# Patient Record
Sex: MALE | Race: WHITE | NOT HISPANIC OR LATINO | URBAN - METROPOLITAN AREA
[De-identification: names, ages, dates, MRNs, and addresses within clinical notes are randomized per-mention and may not be internally consistent; named-entity substitution may affect disease eponyms.]

---

## 2018-01-19 ENCOUNTER — TRANSFERRED RECORDS (OUTPATIENT)
Dept: HEALTH INFORMATION MANAGEMENT | Facility: CLINIC | Age: 58
End: 2018-01-19

## 2018-04-04 ENCOUNTER — TRANSFERRED RECORDS (OUTPATIENT)
Dept: HEALTH INFORMATION MANAGEMENT | Facility: CLINIC | Age: 58
End: 2018-04-04

## 2018-05-16 ENCOUNTER — TRANSFERRED RECORDS (OUTPATIENT)
Dept: HEALTH INFORMATION MANAGEMENT | Facility: CLINIC | Age: 58
End: 2018-05-16

## 2019-05-30 ENCOUNTER — TRANSFERRED RECORDS (OUTPATIENT)
Dept: HEALTH INFORMATION MANAGEMENT | Facility: CLINIC | Age: 59
End: 2019-05-30

## 2019-06-10 ENCOUNTER — AMBULATORY - HEALTHEAST (OUTPATIENT)
Dept: PHYSICAL MEDICINE AND REHAB | Facility: CLINIC | Age: 59
End: 2019-06-10

## 2019-06-10 DIAGNOSIS — M54.12 CERVICAL RADICULOPATHY: ICD-10-CM

## 2019-06-10 DIAGNOSIS — G89.29 CHRONIC NECK PAIN: ICD-10-CM

## 2019-06-10 DIAGNOSIS — M54.2 CHRONIC NECK PAIN: ICD-10-CM

## 2019-06-11 ENCOUNTER — RECORDS - HEALTHEAST (OUTPATIENT)
Dept: ADMINISTRATIVE | Facility: OTHER | Age: 59
End: 2019-06-11

## 2019-06-17 ENCOUNTER — HOSPITAL ENCOUNTER (OUTPATIENT)
Dept: ULTRASOUND IMAGING | Facility: CLINIC | Age: 59
Discharge: HOME | End: 2019-06-17
Admitting: RADIOLOGY

## 2019-06-17 DIAGNOSIS — R93.89 ABNORMAL IMAGING OF THYROID: ICD-10-CM

## 2019-06-18 LAB
CAP COMMENT: NORMAL
LAB AP CHARGES (HE HISTORICAL CONVERSION): NORMAL
LAB AP INITIAL CYTO EVAL (HE HISTORICAL CONVERSION): NORMAL
LAB MED GENERAL PATH INTERP (HE HISTORICAL CONVERSION): NORMAL
PATH REPORT.COMMENTS IMP SPEC: NORMAL
PATH REPORT.FINAL DX SPEC: NORMAL
PATH REPORT.MICROSCOPIC SPEC OTHER STN: NORMAL
PATH REPORT.RELEVANT HX SPEC: NORMAL
SPECIMEN DESCRIPTION: NORMAL

## 2019-06-21 ENCOUNTER — HOSPITAL ENCOUNTER (OUTPATIENT)
Dept: PHYSICAL MEDICINE AND REHAB | Facility: CLINIC | Age: 59
Discharge: HOME | End: 2019-06-21
Attending: PHYSICAL MEDICINE & REHABILITATION

## 2019-06-21 DIAGNOSIS — M48.02 FORAMINAL STENOSIS OF CERVICAL REGION: ICD-10-CM

## 2019-06-21 DIAGNOSIS — M54.2 NECK PAIN: ICD-10-CM

## 2019-06-21 DIAGNOSIS — M54.12 CERVICAL RADICULITIS: ICD-10-CM

## 2019-06-21 RX ORDER — TRAMADOL HYDROCHLORIDE 50 MG/1
50 TABLET ORAL 2 TIMES DAILY
Status: SHIPPED | COMMUNITY
Start: 2019-06-06

## 2019-06-21 RX ORDER — PANTOPRAZOLE SODIUM 40 MG/1
40 TABLET, DELAYED RELEASE ORAL
Status: SHIPPED | COMMUNITY
Start: 2017-12-22

## 2019-06-21 RX ORDER — GABAPENTIN 300 MG/1
CAPSULE ORAL
Qty: 270 CAPSULE | Refills: 2 | Status: SHIPPED | OUTPATIENT
Start: 2019-06-21

## 2019-06-21 RX ORDER — SILDENAFIL CITRATE 20 MG/1
TABLET ORAL
Status: SHIPPED | COMMUNITY
Start: 2017-09-18

## 2019-06-21 ASSESSMENT — MIFFLIN-ST. JEOR: SCORE: 1463.51

## 2019-07-12 ENCOUNTER — COMMUNICATION - HEALTHEAST (OUTPATIENT)
Dept: PHYSICAL MEDICINE AND REHAB | Facility: CLINIC | Age: 59
End: 2019-07-12

## 2019-08-01 ENCOUNTER — ANESTHESIA - HEALTHEAST (OUTPATIENT)
Dept: SURGERY | Facility: CLINIC | Age: 59
End: 2019-08-01

## 2019-08-01 ASSESSMENT — MIFFLIN-ST. JEOR: SCORE: 1454.44

## 2019-08-02 ENCOUNTER — SURGERY - HEALTHEAST (OUTPATIENT)
Dept: SURGERY | Facility: CLINIC | Age: 59
End: 2019-08-02

## 2021-05-29 NOTE — PROGRESS NOTES
ASSESSMENT: Yehuda Hernandez (AKDEX Desai) is a 59 y.o. male  with a BMI of 31.83  with past medical history significant for GERD and osteoarthritis who presents today for new patient evaluation of bilateral neck pain (left greater than right) with left radicular arm and hand symptoms.  The radicular symptoms are worse in the neck pain.  Patient's MRI does show significant degenerative findings at multiple levels, but the findings are worse at the left C6-7 level (significant left C6-7 foraminal stenosis) which correlate with his symptoms.  He has some mild sensory impairment in the left thumb and index finger, but otherwise is neurologically intact and without any red flag/myelopathic symptoms.    NDI: 12%  WHO-5: 18 (patient is not interested in behavioral health services)    PLAN:  A shared decision making model was used.  The patient's values and choices were respected.  The following represents what was discussed and decided upon by the physician and the patient.      1.  DIAGNOSTIC TESTS: The patient's MRI of the lumbar spine performed at [facility] on May 31 of 2019 was personally reviewed today by the physician with the physician performing her own interpretation.  Patient does have inflammatory arthropathy of the left C7-T1 facet joint causing left C7-T1 foraminal stenosis.  The patient has left greater than right C6-7 foraminal stenosis, with severe left C6-7 foraminal stenosis..  Patient has right greater than left foraminal stenosis seen at this the C4-5 and C5-6 levels.  There is bilateral foraminal stenosis seen at the C3-4 level with central canal stenosis at this level but without spinal cord signal change.  There is a thyroid nodule seen (the patient recently underwent a thyroid biopsy and results are still pending).  -No further diagnostic testing is necessary at this time.  2.  PHYSICAL THERAPY:    Recommended the patient undergo physical therapy to learn a home exercise program.  Explained how physical  therapy can help decrease his symptoms, even if it will not improve the actual imaging findings on the MRI.  As long as his symptoms improve, that is the main goal of treatment.  Patient will will complete physical therapy as previously ordered.  He is encouraged to do the exercises on a regular basis.  3.  MEDICATIONS: Discussed Gabapentin/Neurontin (nerve pain medication) mechanism of action as well as potential side effects (drowsiness/dizziness) with the patient.  The patient wanted to try this medication so Gabapentin 300 mg was prescribed.  A chart was given with how to increase the dose to a maximum of 3 tablets three times a day.  The lowest therapeutic dose should be used.   The patient is asked to call the clinic if there are any side effects to the Gabapentin or if questions arise as to how to increase the dose.  4.  INTERVENTIONS: The patient was scheduled for a left C6-7 transforaminal epidural steroid injection later this afternoon, but it was agreed by the physician and the patient that he should try the conservative treatment first.  If he fails to have relief with conservative treatment, then we can forward with the left C6-7 transforaminal epidural steroid injection.    -If he were to fail to have relief with a left C6-7 TF GEOVANNA, would likely recommend moving forward with a left C5-6 TF GEOVANNA versus a left paramedian C7-T1 interlaminar epidural steroid injection.  This may depend on the distribution of his symptoms (currently in the left C6 distribution).  5.  PATIENT EDUCATION:   -Discussed with the patient that the imaging findings do not always correlate with pain.  The biggest outcome is determined by how the patient is feeling.  Explained that he has had these findings on the MRI for several years, and that once degenerative changes are seen, they do not improve or go away.  However if the pain is under good control, that is what matters the most.  -The patient asked if his symptoms could be  related to the shoulder replacement surgery that he had.  Dr. Fairbanks explained that potentially it could be or could be from irritation from the neck.  It is possible that the shoulder surgery could have indirectly irritated things in his neck based on the positioning for the surgery.  -All of his questions were answered to his satisfaction today.  He was in agreement with the treatment plan.  6.  FOLLOW-UP:   Nurse navigation is asked to call the patient in 3 weeks to check his status with gabapentin and physical therapy.  If there are any questions/concerns or any significant worsening of pain prior to that time, the patient is asked to call the clinic via the nurse navigation line or via [employer].         SUBJECTIVE:  Yehuda Hernandez (AKDEX Desai) Is a 59 y.o. male who presents today for new patient evaluation of neck pain and left radicular arm symptoms.  The patient reports that he has bilateral neck pain, left greater than right with numbness in the left hand.  The hand symptoms are significantly worse compared to the neck pain.  The symptoms started about 3 weeks ago.  He reports he really does not have much neck pain unless he is doing something in one position for long period of time.  Numbness is in the entire hand and all of the fingers.  He does feel like he is weak.  He occasionally has symptoms in the right hand, but he says that these are mild and not like they are in the left hand.  Most of the pain is in the posterior medial arm and then goes into the flexor forearm.  He reports that this improved though with heat and ice.  However the fingers remain numb.  He is wondering if this could potentially be related to his shoulder replacement surgery.  His current symptoms are worse with lifting heavy objects.  He does feel better with rest.  He has just started physical therapy this week.  He has not had any injections and has no history of cervical surgeries, though he does have a history of lumbar  surgery.  He does take tramadol at 100 mg twice a day, but this is more for his restless leg syndrome and he does not notice any improvement with the hand symptoms with taking tramadol.    Of note the patient is left-hand dominant.    Medications:  Reviewed and correct in the chart.     Allergies: Reviewed and daily per patient.    PMH:  Reviewed and significant for GERD and osteoarthritis.    PSH:  Reviewed and significant for left shoulder replacement, lumbar fusion surgery x2, thyroid biopsy, mastoid surgery, bilateral knee arthroscopic surgeries, hernia repair.    Family History:  Reviewed and significant for congestive heart failure secondary to rheumatic fever in his father as well as coronary artery disease/MI in his uncle.    Social History: The patient is  and works in sales.  He smokes about 2 cigars/day.  He drinks beer a couple of times per week.  He states that he does use recreational marijuana every day.    ROS: The patient has occasional mild headaches, a sensation of itching in the left arm that correlates to the tingling, joint pain secondary to osteoarthritis, anxiety (denies any panic attacks).  Specifically negative for bowel/bladder retention, dysphagia, imbalance/falls, difficulty with fine motor skills.  Otherwise 13 systems reviewed are negative.  Please see the patient's intake questionnaire from today for details.      OBJECTIVE:  PHYSICAL EXAMINATION:  CONSTITUTIONAL:  Vital signs as above.  No acute distress.  The patient is well nourished and well groomed.  PSYCHIATRIC:  The patient is awake, alert, oriented to person, place, time and answering questions appropriately with clear speech.    HEENT:  Sclera are non-injected.    Moist oral mucosa.  Anterior neck is supple.  SKIN:  Skin over the face, bilateral upper extremities, and posterior torso is clean, dry, intact without rashes.  LYMPH NODES:  No palpable or tender anterior/posterior cervical, submandibular, or supraclavicular  lymph nodes.    MUSCLE STRENGTH:  5/5 strength for the bilateral shoulder abductors, elbow flexors/extensors, wrist extensors, finger flexors/abductors.  NEURO:  2/4 symmetric biceps, brachioradialis, triceps reflexes bilaterally.  Sensation to pinprick is impaired in the left thumb and index finger compared to these digits on the right hand.  Otherwise sensation to pinprick is intact in all the digits of both upper extremities.  Positive Alicia's on the right, but negative Alicia's on the left.  VASCULAR:  2/4 radial pulses bilaterally.  Warm upper limbs bilaterally.  Capillary refill in the upper extremities is less than 1 second.  MUSCULOSKELETAL: Spurling's test is positive on the left side for pain radiating down the left arm.  Negative Spurling's on the right side.  Patient has largely normal range of motion of the cervical spine with flexion, extension, and rotation with more moderate restriction with bilateral cervical sidebending.  Patient does have mild tenderness over the bilateral upper trapezius muscles.  No significant tenderness of the bilateral cervical paraspinal muscles.

## 2021-05-29 NOTE — PATIENT INSTRUCTIONS - HE
We will cancel your injection for this afternoon.   If you decide you want to move forward with the injection, please call [phone] and they will confirm your symptoms and then schedule you for the injection.  On the day of your injection, you cannot be sick or taking antibiotics.  If you become sick and are prescribed, please call the clinic so your injection can be rescheduled for once you have completed your antibiotics.  You will need to bring a  with you for your injection.        Gabapentin (nerve pain medication) was prescribed today.  Please use the chart to increase the dose to an amount that controls your pain (do not exceed 3 tablets three times a day).  If you have any questions on how to increase the dose or any side effects to this medication, please call the clinic.    Gabapentin 300mg Dosing Chart    DATE  MORNING AFTERNOON BEDTIME    Day 1 0 0 1    Day 2 0 0 1    Day 3 0 0 1    Day 4 1 0 1    Day 5 1 0 1    Day 6 1 0 1    Day 7 1 1 1    Day 8 1 1 1    Day 9 1 1 1    Day 10 1 1 2    Day 11 1 1 2    Day 12 1 1 2    Day 13 2 1 2    Day 14 2 1 2    Day 15 2 1 2    Day 16 2 2 2    Day 17 2 2 2    Day 18 2 2 2    Day 19 2 2 3    Day 20 2 2 3    Day 21 2 2 3    Day 22 3 2 3    Day 23 3 2 3    Day 24 3 2 3    Day 25 3 3 3    Day 26 3 3 3    Day 27 3 3 3     Continue medication, taking 3 capsules three times daily    Please call if you have any questions regarding how to take your medication  Clinic Phone # [phone]    A nurse will call you in 3 weeks to see how you are doing.  If you are doing better at that time, you are welcome to follow-up as needed.  If you are not doing better, the nurse will relay this information to the physician and then further recommendations can be made. Please do not hesitate to contact the clinic at [phone] if you have any questions/concerns or any worsening of your pain prior to that time.  You are also welcome to contact Dr. Fairbanks via [employer]

## 2021-05-30 ENCOUNTER — RECORDS - HEALTHEAST (OUTPATIENT)
Dept: ADMINISTRATIVE | Facility: CLINIC | Age: 61
End: 2021-05-30

## 2021-05-30 NOTE — TELEPHONE ENCOUNTER
"Follow-up call placed to pt after last appt with Dr. Fairbanks on 6/21/19. Pt states \"I'm doing good. I'm up to 2-2-2 on the gabapentin\". Pt states he is not going to do any more PT at this time. He adds, \"I do the PT they showed me and do all of the neck exercises and whatnot\". He states he will follow-up as needed. Advised pt to contact [facility] with any questions/concerns or worsening symptoms. Pt verbalized understanding and the call was ended.   "

## 2021-05-31 NOTE — ANESTHESIA CARE TRANSFER NOTE
Last vitals:   Vitals:    08/02/19 1203   BP: (!) 177/92   Pulse: 76   Resp: 20   Temp: 36.3  C (97.4  F)   SpO2: 92%     Patient's level of consciousness is drowsy  Spontaneous respirations: yes  Maintains airway independently: yes  Dentition unchanged: yes  Oropharynx: oropharynx clear of all foreign objects    QCDR Measures:  ASA# 20 - Surgical Safety Checklist: WHO surgical safety checklist completed prior to induction    PQRS# 430 - Adult PONV Prevention: 4558F - Pt received => 2 anti-emetic agents (different classes) preop & intraop  ASA# 8 - Peds PONV Prevention: NA - Not pediatric patient, not GA or 2 or more risk factors NOT present  PQRS# 424 - Winter-op Temp Management: 4559F - At least one body temp DOCUMENTED => 35.5C or 95.9F within required timeframe  PQRS# 426 - PACU Transfer Protocol: - Transfer of care checklist used  ASA# 14 - Acute Post-op Pain: ASA14B - Patient did NOT experience pain >= 7 out of 10

## 2021-05-31 NOTE — ANESTHESIA POSTPROCEDURE EVALUATION
Patient: Yehuda Hernandez  RIGHT SHOULDER ARTHROSCOPY BICEPS TENOTOMY and SUBSCAPULARIS REPAIR  Anesthesia type: general    Patient location: PACU  Last vitals:   Vitals Value Taken Time   /82 8/2/2019 12:40 PM   Temp 36.8  C (98.2  F) 8/2/2019 12:40 PM   Pulse 71 8/2/2019 12:51 PM   Resp 20 8/2/2019 12:37 PM   SpO2 94 % 8/2/2019 12:51 PM   Vitals shown include unvalidated device data.  Post vital signs: stable  Level of consciousness: awake and responds to simple questions  Post-anesthesia pain: pain controlled  Post-anesthesia nausea and vomiting: no  Pulmonary: unassisted, return to baseline  Cardiovascular: stable and blood pressure at baseline  Hydration: adequate  Anesthetic events: no    QCDR Measures:  ASA# 11 - Winter-op Cardiac Arrest: ASA11B - Patient did NOT experience unanticipated cardiac arrest  ASA# 12 - Winter-op Mortality Rate: ASA12B - Patient did NOT die  ASA# 13 - PACU Re-Intubation Rate: ASA13B - Patient did NOT require a new airway mgmt  ASA# 10 - Composite Anes Safety: ASA10A - No serious adverse event    Additional Notes:

## 2021-05-31 NOTE — ANESTHESIA PROCEDURE NOTES
Peripheral Block    Patient location during procedure: pre-op  Start time: 8/2/2019 9:38 AM  End time: 8/2/2019 9:39 AM  post-op analgesia per surgeon order as noted in medical record  Staffing:  Performing  Anesthesiologist: Bernard Ramirez MD  Preanesthetic Checklist  Completed: patient identified, site marked, risks, benefits, and alternatives discussed, timeout performed, consent obtained, airway assessed, oxygen available, suction available, emergency drugs available and hand hygiene performed  Peripheral Block  Block type: other, superficial cervical plexus  Prep: ChloraPrep  Patient position: sitting  Patient monitoring: cardiac monitor, continuous pulse oximetry, heart rate and blood pressure  Laterality: right  Injection technique: ultrasound guided    Ultrasound used to visualize needle placement in proximity to nerve being blocked: yes   Permanent ultrasound image captured for medical record  Sterile gel and probe cover used for ultrasound.    Needle  Needle type: Stimuplex   Needle gauge: 21 G  Needle length: 4 in  no peripheral nerve catheter placed  Assessment  Injection assessment: no difficulty with injection, negative aspiration for heme, no paresthesia on injection and incremental injection

## 2021-05-31 NOTE — ANESTHESIA PREPROCEDURE EVALUATION
Anesthesia Evaluation      Patient summary reviewed   No history of anesthetic complications     Airway   Mallampati: II  Neck ROM: full   Pulmonary - normal exam   (+) a smoker (tobacco; daily marijuana)                         Cardiovascular - normal exam  Exercise tolerance: > or = 4 METS   Neuro/Psych    (+) depression, anxiety/panic attacks, chronic pain (neck/back)    Comments: RLS      Endo/Other    (+) obesity (bmi 31),      GI/Hepatic/Renal    (+) GERD well controlled and intermittent,             Dental    (+) poor dentition                       Anesthesia Plan  Planned anesthetic: general endotracheal  GETA with ISB for postop pain control  Versed/fent/prop/kenisha  Decadron/zofran    ASA 3   Induction: intravenous   Anesthetic plan and risks discussed with: patient and parent/guardian  Anesthesia plan special considerations: antiemetics,   Post-op plan: routine recovery

## 2021-05-31 NOTE — ANESTHESIA PROCEDURE NOTES
Peripheral Block    Patient location during procedure: pre-op  Start time: 8/2/2019 9:35 AM  End time: 8/2/2019 9:37 AM  post-op analgesia per surgeon order as noted in medical record  Staffing:  Performing  Anesthesiologist: Bernard Ramirez MD  Preanesthetic Checklist  Completed: patient identified, site marked, risks, benefits, and alternatives discussed, timeout performed, consent obtained, airway assessed, oxygen available, suction available, emergency drugs available and hand hygiene performed  Peripheral Block  Block type: brachial plexus, interscalene  Prep: ChloraPrep  Patient position: sitting  Patient monitoring: cardiac monitor, continuous pulse oximetry, heart rate and blood pressure  Laterality: right  Injection technique: ultrasound guided    Ultrasound used to visualize needle placement in proximity to nerve being blocked: yes   Permanent ultrasound image captured for medical record  Sterile gel and probe cover used for ultrasound.    Needle  Needle type: Stimuplex   Needle gauge: 21 G  Needle length: 4 in  no peripheral nerve catheter placed  Assessment  Injection assessment: no difficulty with injection, negative aspiration for heme, no paresthesia on injection and incremental injection

## 2021-06-03 VITALS — HEIGHT: 62 IN | WEIGHT: 170.9 LBS | BODY MASS INDEX: 31.45 KG/M2

## 2021-06-03 VITALS — BODY MASS INDEX: 32.02 KG/M2 | HEIGHT: 62 IN | WEIGHT: 174 LBS

## 2021-07-03 ENCOUNTER — HEALTH MAINTENANCE LETTER (OUTPATIENT)
Age: 61
End: 2021-07-03

## 2021-10-23 ENCOUNTER — HEALTH MAINTENANCE LETTER (OUTPATIENT)
Age: 61
End: 2021-10-23

## 2022-07-30 ENCOUNTER — HEALTH MAINTENANCE LETTER (OUTPATIENT)
Age: 62
End: 2022-07-30

## 2022-10-09 ENCOUNTER — HEALTH MAINTENANCE LETTER (OUTPATIENT)
Age: 62
End: 2022-10-09

## 2023-03-25 ENCOUNTER — HEALTH MAINTENANCE LETTER (OUTPATIENT)
Age: 63
End: 2023-03-25